# Patient Record
Sex: MALE | Race: WHITE
[De-identification: names, ages, dates, MRNs, and addresses within clinical notes are randomized per-mention and may not be internally consistent; named-entity substitution may affect disease eponyms.]

---

## 2018-08-27 ENCOUNTER — HOSPITAL ENCOUNTER (EMERGENCY)
Dept: HOSPITAL 45 - C.EDB | Age: 23
Discharge: HOME | End: 2018-08-27
Payer: COMMERCIAL

## 2018-08-27 VITALS
BODY MASS INDEX: 35.13 KG/M2 | HEIGHT: 70 IN | HEIGHT: 70 IN | WEIGHT: 245.37 LBS | BODY MASS INDEX: 35.13 KG/M2 | WEIGHT: 245.37 LBS

## 2018-08-27 VITALS — TEMPERATURE: 98.78 F

## 2018-08-27 VITALS — SYSTOLIC BLOOD PRESSURE: 118 MMHG | OXYGEN SATURATION: 100 % | HEART RATE: 67 BPM | DIASTOLIC BLOOD PRESSURE: 67 MMHG
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
## 2018-08-27 DIAGNOSIS — S61.001A: Primary | ICD-10-CM

## 2018-08-27 NOTE — EMERGENCY ROOM VISIT NOTE
ED Visit Note


First contact with patient:  15:02


CHIEF COMPLAINT: Right thumb laceration








HISTORY OF PRESENT ILLNESS: This 23-year-old male patient presents to the 

emergency department, ambulatory, approximately 30 minute after cutting the 

lateral aspect of the distal right thumb.  The patient states he was using a 

knife to cut food when the knife slipped, causing laceration.  The bleeding has 

not stopped.  There is no weakness or numbness of the area.  Tetanus shot is up-

to-date.  Full range of motion of the thumb.  The patient rates the pain as 

stinging and 2/10.


  





REVIEW OF SYSTEMS:   A 6 system review of systems was completed with positives 

and pertinent negatives listed in the HPI. 








ALLERGIES: None








MEDICATIONS: None








PMH: None








SOCIAL HISTORY: The patient lives locally with his roommate.  He is a [employer] 

[employer] student.  He denies drug, alcohol, tobacco use.








PHYSICAL EXAM: Vital Signs: Reviewed Nurse's notes, vital signs stable.  GENERAL

: This is a 23-year-old white male, in no acute distress, well-developed, well-

nourished.  SKIN:  There is a 2 cm long avulsion laceration on the lateral 

aspect of the distal phalanx of the right thumb.  It is superficial and the top 

layer of skin has been avulsed. There is no foreign material in the wound and 

it looks clean. There is active bleeding.  No deep structures are seen in the 

base of the wound.  Extension and flexion of the thumb is full and strong.  

Sensation to pain and light touch is intact.








EMERGENCY DEPARTMENT COURSE:  I examined the patient.  Verbal consent was 

obtained to perform the procedure.  The right thumb was cleaned with saline and 

betadine.  Gelfoam was applied to the avulsion laceration and the area was 

dressed with a pressure dressing.  The bleeding stopped.  The patient tolerated 

the procedure well.  The patient was discharged home in stable condition.    





I attest that I have personally reviewed the patient's current medication list. 


Patient was found to have normal blood pressure on screening and does not 

require follow-up. 





Differential diagnosis includes laceration, contusion, fracture, sprain/strain, 

tendon or ligament injury, neurovascular compromise, foreign body, assault, and 

others


 





DIAGNOSIS:  Avulsion laceration of the right thumb





The chart was completed utilizing Dragon Speech voice recognition software. 

Grammatical errors, random word insertions, pronoun errors, and incomplete 

sentences are an occasional consequence of this system due to software 

limitations, ambient noise, and hardware issues. Any formal questions or 

concerns about the content, text, or information contained within the body of 

this dictation should be directly addressed to the provider for clarification.








DISCHARGE INSTRUCTIONS & TREATMENT: Keep dressing in place for 48 hrs, then 

remove.  Soak foam in water until it falls off easily, then clean wound daily, 

cover with an antibiotic ointment and keep covered until it heals.  Return for 

any signs of infection (increasing redness, swelling, drainage, fever).  Ice 

and elevate for swelling and pain.  Ibuprofen 600 mg and Tylenol 1000 mg every 

6 hrs for pain.  Keep covered when in sun until fully healed then SPF 50 or 

higher for one year.  Vitamin E oil if desired two weeks after fully healed for 

reduction of scar.


Current/Historical Medications


No Active Prescriptions or Reported Meds





Allergies


Coded Allergies:  


     No Known Allergies (Unverified , 8/27/18)





Vital Signs











  Date Time  Temp Pulse Resp B/P (MAP) Pulse Ox O2 Delivery O2 Flow Rate FiO2


 


8/27/18 14:58 37.1 65 20 123/75 94 Room Air  











Medications Administered











 Medications


  (Trade)  Dose


 Ordered  Sig/Naina


 Route  Start Time


 Stop Time Status Last Admin


Dose Admin


 


 Gelatin


  (Surgifoam


 Sponge 12-7MM


  (SMALL))  1 ea  NOW  STAT


 EXT  8/27/18 15:18


 8/27/18 15:20 DC 8/27/18 15:32


1 EA











Departure Information


Impression





 Primary Impression:  


 Avulsion of skin of finger without complication





Dispostion


Home / Self-Care





Condition


GOOD





Prescriptions





No Active Prescriptions or Reported Meds





Referrals


[facility] (PCP)





Patient Instructions


ED Gelfoam Dressing, [facility]





Additional Instructions





You have been treated in the Emergency Department today for your right thumb 

skin avulsion.





Leave the GELFOAM and dressing in place for the next 48 hours. Keep the 

dressing clean and dry until time for removal. To remove the GELFOAM dressing, 

remove the overlying tape and then soak the wound in warm water until the piece 

of GELFOAM can be easily removed.





Proper wound care is essential for adequate wound healing and infection 

prevention. You can shower and clean the wound with soap and water. Do not 

scour over the wound, pat dry with a towel. You can use an antibiotic ointment 

with a dressing/bandage over the wound for the next 3-4 days. After this time 

you may leave the wound dry and open to the air.





Look for signs of infection of the wound including: increased pain, swelling, 

foul discharge, streaking, or increased temperature. If any of these are 

noticed you should return to the Emergency Department for further assessment 

and treatment.





As with any laceration you may have received nerve damage to the surrounding 

tissues. This damage could be permanent.





For pain control, you can use the following over-the-counter medicines (if >13 yo):


Ibuprofen(Motrin, Advil) may be used for fever or pain.  Use 600mg every six 

hours as needed.  Take with food.  Avoid using more than 2400mg in a 24 hour 

period.  Do not use 2400mg per day for more than three consecutive days without 

physician direction.  Prolonged inappropriate use can lead to stomach upset or 

ulcers. 


(AND/OR)


Acetaminophen(Tylenol) may be used for fever or pain.  Use 1000mg every six 

hours as needed.  Avoid using more than 3000mg in a 24 hour period.  





Return to the emergency department if your symptoms worsen despite treatment 

course outlined above.





Problem Qualifiers








 Primary Impression:  


 Avulsion of skin of finger without complication


 Encounter type:  initial encounter  Qualified Codes:  S61.209A - Unspecified 

open wound of unspecified finger without damage to nail, initial encounter